# Patient Record
Sex: FEMALE | Race: WHITE | HISPANIC OR LATINO | Employment: STUDENT | URBAN - METROPOLITAN AREA
[De-identification: names, ages, dates, MRNs, and addresses within clinical notes are randomized per-mention and may not be internally consistent; named-entity substitution may affect disease eponyms.]

---
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
## 2023-07-12 DIAGNOSIS — Z30.011 ORAL CONTRACEPTIVE PRESCRIBED: ICD-10-CM

## 2023-07-12 RX ORDER — NORETHINDRONE ACETATE/ETHINYL ESTRADIOL AND FERROUS FUMARATE 1MG-20(24)
1 KIT ORAL DAILY
Qty: 90 TABLET | Refills: 1 | Status: SHIPPED | OUTPATIENT
Start: 2023-07-12

## 2023-10-29 ENCOUNTER — AMB VIDEO VISIT (OUTPATIENT)
Dept: OTHER | Facility: HOSPITAL | Age: 18
End: 2023-10-29

## 2023-10-29 DIAGNOSIS — H92.02 ACUTE OTALGIA, LEFT: Primary | ICD-10-CM

## 2023-10-29 PROCEDURE — ECARE PR SL URGENT CARE VIRTUAL VISIT: Performed by: PHYSICIAN ASSISTANT

## 2023-10-29 RX ORDER — OFLOXACIN 3 MG/ML
5 SOLUTION AURICULAR (OTIC) DAILY
Qty: 1.75 ML | Refills: 0 | Status: SHIPPED | OUTPATIENT
Start: 2023-10-29 | End: 2023-11-05

## 2023-10-29 NOTE — PATIENT INSTRUCTIONS
Swimmer's Ear   WHAT YOU NEED TO KNOW:   Swimmer's ear, also called otitis externa, is an infection in the outer ear canal. This canal goes from the outside of your ear to your eardrum. Swimmer's ear most often occurs when water remains in your ear after you swim. This creates a moist area for bacteria to grow. Scratches or damage from your fingers, cotton swabs, or other objects can also cause swimmer's ear. DISCHARGE INSTRUCTIONS:   Return to the emergency department if:   You have severe ear pain. You are suddenly not able to hear. You have new swelling in your face, behind your ears, or in your neck. You suddenly cannot move part of your face, or it feels numb. Call your doctor if:   You have a fever. Your symptoms get worse or do not go away, even after treatment. You have questions or concerns about your condition or care. Treatment for swimmer's ear  may include cleaning your outer ear canal first. This will help clean any ear wax, flaky skin, or other discharge. You may then need any of the following:  Medicines:      Ear drops  help fight infection and decrease redness and swelling. Acetaminophen  decreases pain and fever. It is available without a doctor's order. Ask how much to take and how often to take it. Follow directions. Read the labels of all other medicines you are using to see if they also contain acetaminophen, or ask your doctor or pharmacist. Acetaminophen can cause liver damage if not taken correctly. NSAIDs , such as ibuprofen, help decrease swelling, pain, and fever. This medicine is available with or without a doctor's order. NSAIDs can cause stomach bleeding or kidney problems in certain people. If you take blood thinner medicine, always ask your healthcare provider if NSAIDs are safe for you. Always read the medicine label and follow directions. An ear wick  may be used if your ear canal is blocked.  A wick (small tube) made of cotton or gauze is placed in your ear. The wick helps pull extra fluid out of your ear canal. Joyce also help draw medicine into your ear canal.    How to use ear drops:   Warm the bottle of ear drops in your hands  for a few minutes. Lie down on your side with your infected ear facing up. This will help the medicine travel completely through your ear canal.    Gently pull the ear up and back. Carefully drip the correct number of ear drops into your ear. Have another person help you if possible. For children younger than 3 years,  gently pull and hold the ear down and back. For children older than 3 years,  gently pull and hold the ear up and back. Stay in the same position  for 3 to 5 minutes to let the medicine soak in. Prevent swimmer's ear:   Dry your ears completely after you swim or bathe. Gently wipe your outer ear with a soft towel or cloth. Use ear plugs when you swim. Do not put cotton swabs or other objects in your ears. These can scratch or damage your ear. They can also push ear wax deeper in and irritate your ear. Put cotton balls gently in your outer ear  when you apply hair spray, hair dye, or perfume. Follow up with your doctor as directed:  Write down your questions so you remember to ask them during your visits. © Copyright Beatriz Palma 2023 Information is for End User's use only and may not be sold, redistributed or otherwise used for commercial purposes. The above information is an  only. It is not intended as medical advice for individual conditions or treatments. Talk to your doctor, nurse or pharmacist before following any medical regimen to see if it is safe and effective for you.

## 2023-10-29 NOTE — PROGRESS NOTES
Video Visit - [facility] 19 y.o. female MRN: [id number]    REQUIRED DOCUMENTATION:         1. This service was provided via atColl[employer]. 2. Provider located at [address] [place] [id number] [phone].  3. [facility] provider: Kavita Doyle PA-C.  4. Identify all parties [facility]:  No one else  5. After connecting through [employer], patient was identified by name and date of birth. Patient was then informed that this was a Telemedicine visit and that the exam was being conducted confidentially over secure lines. My office door was closed. No one else was in the room. Patient acknowledged consent and understanding of privacy and security of the Telemedicine visit. I informed the patient that I have reviewed their record in Epic and presented the opportunity for them to ask any questions regarding the visit today. The patient agreed to participate. VITALS: Heart Rate: 85 BPM, Respiratory Rate: 12 RPM, Temperature Unavailable° F, Blood Pressure Unavailable mmHg, Pulse Ox Unavailable % on RA    HPI  Pt reports L ear pain x few days, today it is swollen inside. Denies drainage. Possibly got water in her ear in the shower. Denies runny nose, congestion, sore throat, fever, dizziness, HA. Physical Exam  Constitutional:       General: She is not in acute distress. Appearance: Normal appearance. She is not toxic-appearing. HENT:      Head: Normocephalic and atraumatic. Left Ear: External ear normal. Tenderness (painful movement of pinna) present. No mastoid tenderness (or overlying redness/swelling). Nose: No rhinorrhea. Mouth/Throat:      Mouth: Mucous membranes are moist.   Eyes:      Conjunctiva/sclera: Conjunctivae normal.   Pulmonary:      Effort: Pulmonary effort is normal. No respiratory distress. Breath sounds: No wheezing (no gross audible wheeze through computer).    Musculoskeletal: Cervical back: Normal range of motion. Skin:     Findings: No rash (on face or neck). Neurological:      Mental Status: She is alert. Cranial Nerves: No dysarthria or facial asymmetry. Psychiatric:         Mood and Affect: Mood normal.         Behavior: Behavior normal.         Diagnoses and all orders for this visit:    Acute otalgia, left  -     ofloxacin (FLOXIN) 0.3 % otic solution; Administer 5 drops into the left ear daily for 7 days      Patient Instructions   Swimmer's Ear   WHAT YOU NEED TO KNOW:   Swimmer's ear, also called otitis externa, is an infection in the outer ear canal. This canal goes from the outside of your ear to your eardrum. Swimmer's ear most often occurs when water remains in your ear after you swim. This creates a moist area for bacteria to grow. Scratches or damage from your fingers, cotton swabs, or other objects can also cause swimmer's ear. DISCHARGE INSTRUCTIONS:   Return to the emergency department if:   You have severe ear pain. You are suddenly not able to hear. You have new swelling in your face, behind your ears, or in your neck. You suddenly cannot move part of your face, or it feels numb. Call your doctor if:   You have a fever. Your symptoms get worse or do not go away, even after treatment. You have questions or concerns about your condition or care. Treatment for swimmer's ear  may include cleaning your outer ear canal first. This will help clean any ear wax, flaky skin, or other discharge. You may then need any of the following:  Medicines:      Ear drops  help fight infection and decrease redness and swelling. Acetaminophen  decreases pain and fever. It is available without a doctor's order. Ask how much to take and how often to take it. Follow directions.  Read the labels of all other medicines you are using to see if they also contain acetaminophen, or ask your doctor or pharmacist. Acetaminophen can cause liver damage if not taken correctly. NSAIDs , such as ibuprofen, help decrease swelling, pain, and fever. This medicine is available with or without a doctor's order. NSAIDs can cause stomach bleeding or kidney problems in certain people. If you take blood thinner medicine, always ask your healthcare provider if NSAIDs are safe for you. Always read the medicine label and follow directions. An ear wick  may be used if your ear canal is blocked. A wick (small tube) made of cotton or gauze is placed in your ear. The wick helps pull extra fluid out of your ear canal. Joyce also help draw medicine into your ear canal.    How to use ear drops:   Warm the bottle of ear drops in your hands  for a few minutes. Lie down on your side with your infected ear facing up. This will help the medicine travel completely through your ear canal.    Gently pull the ear up and back. Carefully drip the correct number of ear drops into your ear. Have another person help you if possible. For children younger than 3 years,  gently pull and hold the ear down and back. For children older than 3 years,  gently pull and hold the ear up and back. Stay in the same position  for 3 to 5 minutes to let the medicine soak in. Prevent swimmer's ear:   Dry your ears completely after you swim or bathe. Gently wipe your outer ear with a soft towel or cloth. Use ear plugs when you swim. Do not put cotton swabs or other objects in your ears. These can scratch or damage your ear. They can also push ear wax deeper in and irritate your ear. Put cotton balls gently in your outer ear  when you apply hair spray, hair dye, or perfume. Follow up with your doctor as directed:  Write down your questions so you remember to ask them during your visits. © Copyright Chelsea Ranks 2023 Information is for End User's use only and may not be sold, redistributed or otherwise used for commercial purposes.   The above information is an  only. It is not intended as medical advice for individual conditions or treatments. Talk to your doctor, nurse or pharmacist before following any medical regimen to see if it is safe and effective for you.

## 2023-10-29 NOTE — CARE ANYWHERE EVISITS
Visit Summary for TIM Arlene ELLISON - Gender: Female - Date of Birth: [birth year]  Date: [id number] - Duration: 8 minutes  Patient: Leonid ELLISON  Provider: Miriam Tidwell PA-C    Patient Contact Information  Address  [place]  [phone]    Visit Topics  Earache [Added Raymundo Hicks - [id number]]    Triage Questions   What is your current physical address in the event of a medical emergency? Answer []  Are you allergic to any medications? Answer []  Are you now or could you be pregnant? Answer []  Do you have any immune system compromise or chronic lung   disease? Answer []  Do you have any vulnerable family members in the home (infant, pregnant, cancer, elderly)? Answer []     Conversation Transcripts  [0A][0A] [Notification] You are connected with Miriam Tidwell PA-C, Urgent Care Specialist.[0A][Notification] Carlos Eduardo Tapia is located in [place]. [0A][Notification] Calros Eduardo Tapia has shared health history. Helena Jacobs[0A]    Diagnosis  Otalgia, left ear    Procedures  Value: [id number] Code: CPT-4 UNLISTED E&M SERVICE    Medications Prescribed    No prescriptions ordered    Electronically signed by: Rita Gold(NPI [id number])

## 2023-11-03 ENCOUNTER — TELEPHONE (OUTPATIENT)
Dept: PEDIATRICS CLINIC | Facility: CLINIC | Age: 18
End: 2023-11-03

## 2023-12-19 ENCOUNTER — ANNUAL EXAM (OUTPATIENT)
Dept: OBGYN CLINIC | Facility: CLINIC | Age: 18
End: 2023-12-19
Payer: COMMERCIAL

## 2023-12-19 VITALS
HEART RATE: 76 BPM | DIASTOLIC BLOOD PRESSURE: 74 MMHG | SYSTOLIC BLOOD PRESSURE: 114 MMHG | HEIGHT: 66 IN | WEIGHT: 139.4 LBS | BODY MASS INDEX: 22.4 KG/M2

## 2023-12-19 DIAGNOSIS — Z01.419 ENCOUNTER FOR GYNECOLOGICAL EXAMINATION WITHOUT ABNORMAL FINDING: Primary | ICD-10-CM

## 2023-12-19 DIAGNOSIS — Z30.011 ORAL CONTRACEPTIVE PRESCRIBED: ICD-10-CM

## 2023-12-19 PROCEDURE — S0612 ANNUAL GYNECOLOGICAL EXAMINA: HCPCS | Performed by: OBSTETRICS & GYNECOLOGY

## 2023-12-19 RX ORDER — NORETHINDRONE ACETATE/ETHINYL ESTRADIOL AND FERROUS FUMARATE 1MG-20(24)
1 KIT ORAL DAILY
Qty: 90 TABLET | Refills: 3 | Status: SHIPPED | OUTPATIENT
Start: 2023-12-19

## 2023-12-19 NOTE — PROGRESS NOTES
Mariely Moran  2005    CC:  Yearly exam    S:  18 y.o. female here for yearly exam. Her cycles are regular, not heavy or crampy.     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  Encouraged her to enjoy that time.      Sexual activity: She is sexually active without pain, bleeding or dryness.     Contraception:  She uses BCP for contraception.      STD testing:  She does not request STD testing today.      Gardasil:  She has had the Gardasil series.     We reviewed [facility] guidelines for Pap testing.     Last Pap - never    Family hx of breast cancer: no  Family hx of ovarian cancer: no  Family hx of colon cancer: no      Current Outpatient Medications:     norethindrone-ethinyl estradiol-ferrous fumarate (Fredi 24 FE) 1-20 MG-MCG(24) per tablet, Take 1 tablet by mouth daily, Disp: 90 tablet, Rfl: 1  Social History     Socioeconomic History    Marital status: Single     Spouse name: Not on file    Number of children: Not on file    Years of education: Not on file    Highest education level: Not on file   Occupational History    Not on file   Tobacco Use    Smoking status: Never    Smokeless tobacco: Never   Vaping Use    Vaping status: Never Used   Substance and Sexual Activity    Alcohol use: Never    Drug use: Never    Sexual activity: Yes     Partners: Male     Birth control/protection: Condom Male, OCP   Other Topics Concern    Not on file   Social History Narrative    Not on file     Social Determinants of Health     Financial Resource Strain: Not on file   Food Insecurity: Not on file   Transportation Needs: Not on file   Physical Activity: Not on file   Stress: Not on file   Social Connections: Not on file   Intimate Partner Violence: Not on file   Housing Stability: Not on file     Family History   Problem Relation Age of Onset    No Known Problems Mother     No Known Problems Father     Mental illness Neg Hx     Substance Abuse Neg Hx      Past Medical  "History:   Diagnosis Date    Atopic contact dermatitis 11/8/2017       Review of Systems   Respiratory: Negative.    Cardiovascular: Negative.    Gastrointestinal: Negative for constipation and diarrhea.   Genitourinary: Negative for difficulty urinating, pelvic pain, vaginal bleeding, vaginal discharge, itching or odor.    O:  Height 5' 5.5\" (1.664 m), weight 63.2 kg (139 lb 6.4 oz), last menstrual period 12/06/2023.    Patient appears well and is not in distress  Neck is supple without masses  Breasts are symmetrical without mass, tenderness, nipple discharge, skin changes or adenopathy.   Abdomen is soft and nontender without masses.   External genitals are normal without lesions or rashes.  +mild irritation on perineum - patient reports occasional irritation, ?related to leggings  Urethra and urethral meatus are normal  Bladder is normal to palpation  Vagina is normal without discharge or bleeding.   Cervix is normal without discharge or lesion.   Uterus is normal, mobile, nontender without palpable mass.  Adnexa are normal, nontender, without palpable mass.     A:   Yearly exam.     P:   Pap at age 19   BCP refills sent   Discussed hydrocortisone ointment PRN and then return to office if no relief    RTO one year for yearly exam or sooner as needed.     "

## 2025-01-04 DIAGNOSIS — Z30.011 ORAL CONTRACEPTIVE PRESCRIBED: ICD-10-CM

## 2025-01-06 RX ORDER — NORETHINDRONE ACETATE AND ETHINYL ESTRADIOL, AND FERROUS FUMARATE 1MG-20(24)
1 KIT ORAL DAILY
Qty: 84 TABLET | Refills: 0 | Status: SHIPPED | OUTPATIENT
Start: 2025-01-06

## 2025-03-05 ENCOUNTER — TELEMEDICINE (OUTPATIENT)
Dept: OTHER | Facility: HOSPITAL | Age: 20
End: 2025-03-05

## 2025-03-05 DIAGNOSIS — H10.32 ACUTE BACTERIAL CONJUNCTIVITIS OF LEFT EYE: Primary | ICD-10-CM

## 2025-03-05 PROCEDURE — ECARE PR SL URGENT CARE VIRTUAL VISIT: Performed by: PHYSICIAN ASSISTANT

## 2025-03-05 RX ORDER — OFLOXACIN 3 MG/ML
SOLUTION/ DROPS OPHTHALMIC
Qty: 5 ML | Refills: 0 | Status: SHIPPED | OUTPATIENT
Start: 2025-03-05 | End: 2025-03-12

## 2025-03-05 NOTE — LETTER
March 5, 2025     Patient: Mariely Moran   YOB: 2005   Date of Visit: 3/5/2025       To Whom it May Concern:    Mariely Moran is under my professional care. She was seen virtually on 3/5/2025. She may return to school on 3/7/25 .    If you have any questions or concerns, please don't hesitate to call.         Sincerely,          Shannon D Severino, PA-C        CC: No Recipients

## 2025-03-06 NOTE — PATIENT INSTRUCTIONS
"Thank you for choosing to trust [facility] with your care today. Virtual visits are very convenient, but do have some limitations. Schedule a follow-up appointment with your primary care physician for recheck in person in 2-3 days-especially if symptoms aren't improving. If you cannot see your PCP, you can schedule a follow up appointment at a [facility]. Go to the emergency department if you develop any new or worsening symptoms including vision change, sudden eye pain, or anything else that is concerning.    Excuses can be found in \"Letters\" section of Cl[facility] rj. Can print if opened from a web browser  Care Anywhere phone number is [phone] if you need assistance or have further questions    [phone]  Schedule or Reschedule Outpatient Testing - Option 2  Billing - Option 3  General Info - Option 4  Clone Help - Option 5  Comprehensive Spine Program - Option 6    "

## 2025-03-06 NOTE — PROGRESS NOTES
Virtual Regular Visit  Name: Mariely Moran      : 2005      MRN: [id number]  Encounter Provider: Shannon D Severino, PA-C  Encounter Date: 3/5/2025   Encounter department: VIRTUAL CARE       Verification of patient location:  Patient is located at Other in the following state in which I hold an active license [place] :  Assessment & Plan  Acute bacterial conjunctivitis of left eye    Orders:    ofloxacin (OCUFLOX) 0.3 % ophthalmic solution; Administer 1 drop into the left eye every 2 (two) hours for 2 days, THEN 1 drop every 4 (four) hours for 5 days.        Encounter provider Shannon D Severino, PA-C    The patient was identified by name and date of birth. Mariely Moran was informed that this is a telemedicine visit and that the visit is being conducted through the Epic Embedded platform. She agrees to proceed..  My office door was closed. No one else was in the room.  She acknowledged consent and understanding of privacy and security of the video platform. The patient has agreed to participate and understands they can discontinue the visit at any time.    Patient is aware this is a billable service.     History obtained from: patient  History of Present Illness     Pt reports 2 nights ago noticed eye redness. Hasn't had drainage. Wears biweekly contacts. No eye injury, FB. No vision changes      Review of Systems   Constitutional:  Negative for fever.   HENT:  Negative for nosebleeds.    Eyes:  Positive for redness.   Respiratory:  Negative for shortness of breath.    Cardiovascular:  Negative for chest pain.   Gastrointestinal:  Negative for blood in stool.   Genitourinary:  Negative for hematuria.   Musculoskeletal:  Negative for gait problem.   Skin:  Negative for rash.   Neurological:  Negative for seizures.   Psychiatric/Behavioral:  Negative for behavioral problems.        Objective   There were no vitals taken for this visit.    Physical Exam  Constitutional:       General: She is not in acute  distress.     Appearance: Normal appearance. She is not toxic-appearing.   HENT:      Head: Normocephalic and atraumatic.      Nose: No rhinorrhea.      Mouth/Throat:      Mouth: Mucous membranes are moist.   Eyes:      Conjunctiva/sclera:      Left eye: Left conjunctiva is injected.   Pulmonary:      Effort: Pulmonary effort is normal. No respiratory distress.      Breath sounds: No wheezing (no gross audible wheeze through computer).   Musculoskeletal:      Cervical back: Normal range of motion.   Skin:     Findings: No rash (on face or neck).   Neurological:      Mental Status: She is alert.      Cranial Nerves: No dysarthria or facial asymmetry.   Psychiatric:         Mood and Affect: Mood normal.         Behavior: Behavior normal.         Visit Time  Total Visit Duration: 6 minutes not including the time spent for establishing the audio/video connection.

## 2025-03-11 ENCOUNTER — ANNUAL EXAM (OUTPATIENT)
Dept: OBGYN CLINIC | Facility: CLINIC | Age: 20
End: 2025-03-11
Payer: COMMERCIAL

## 2025-03-11 VITALS
DIASTOLIC BLOOD PRESSURE: 70 MMHG | SYSTOLIC BLOOD PRESSURE: 116 MMHG | HEIGHT: 66 IN | WEIGHT: 141.4 LBS | BODY MASS INDEX: 22.73 KG/M2

## 2025-03-11 DIAGNOSIS — Z01.419 ENCNTR FOR GYN EXAM (GENERAL) (ROUTINE) W/O ABN FINDINGS: Primary | ICD-10-CM

## 2025-03-11 DIAGNOSIS — Z30.011 ORAL CONTRACEPTIVE PRESCRIBED: ICD-10-CM

## 2025-03-11 PROCEDURE — S0612 ANNUAL GYNECOLOGICAL EXAMINA: HCPCS | Performed by: OBSTETRICS & GYNECOLOGY

## 2025-03-11 PROCEDURE — G0145 SCR C/V CYTO,THINLAYER,RESCR: HCPCS | Performed by: OBSTETRICS & GYNECOLOGY

## 2025-03-11 RX ORDER — NORETHINDRONE ACETATE AND ETHINYL ESTRADIOL, AND FERROUS FUMARATE 1MG-20(24)
1 KIT ORAL DAILY
Qty: 84 TABLET | Refills: 4 | Status: SHIPPED | OUTPATIENT
Start: 2025-03-11

## 2025-03-11 NOTE — PROGRESS NOTES
Mraiely Moran  2005    CC:  Yearly exam    S:  19 y.o. female here for yearly exam. Her cycles are regular, not heavy or crampy.     Sexual activity: She has been sexually active without pain, bleeding or dryness. She is not currently active.     Contraception:  She uses BCP for contraception.      STD testing:  She does not request STD testing today.      Gardasil:  She has had the Gardasil series.     We reviewed [facility] guidelines for Pap testing.     Last Pap - never      Current Outpatient Medications:     norethindrone-ethinyl estradiol-ferrous fumarate (Ingrid 24 Fe) 1-20 MG-MCG(24) per tablet, TAKE 1 TABLET DAILY, Disp: 84 tablet, Rfl: 0    ofloxacin (OCUFLOX) 0.3 % ophthalmic solution, Administer 1 drop into the left eye every 2 (two) hours for 2 days, THEN 1 drop every 4 (four) hours for 5 days., Disp: 5 mL, Rfl: 0  Social History     Socioeconomic History    Marital status: Single     Spouse name: Not on file    Number of children: Not on file    Years of education: Not on file    Highest education level: Not on file   Occupational History    Not on file   Tobacco Use    Smoking status: Never    Smokeless tobacco: Never   Vaping Use    Vaping status: Never Used   Substance and Sexual Activity    Alcohol use: Never    Drug use: Never    Sexual activity: Yes     Birth control/protection: Abstinence, OCP   Other Topics Concern    Not on file   Social History Narrative    Not on file     Social Drivers of Health     Financial Resource Strain: Not on file   Food Insecurity: Not on file   Transportation Needs: Not on file   Physical Activity: Not on file   Stress: Not on file   Social Connections: Not on file   Intimate Partner Violence: Not on file   Housing Stability: Not on file     Family History   Problem Relation Age of Onset    No Known Problems Mother     No Known Problems Father     Mental illness Neg Hx     Substance Abuse Neg Hx      Past Medical History:   Diagnosis Date    Atopic contact  "dermatitis 11/8/2017       Review of Systems   Respiratory: Negative.    Cardiovascular: Negative.    Gastrointestinal: Negative for constipation and diarrhea.   Genitourinary: Negative for difficulty urinating, pelvic pain, vaginal bleeding, vaginal discharge, itching or odor.    O:  Blood pressure 116/70, height 5' 5.5\" (1.664 m), weight 64.1 kg (141 lb 6.4 oz), last menstrual period 03/05/2025.    Patient appears well and is not in distress  Neck is supple without masses  Breasts exam deferred at patient request.   Abdomen is soft and nontender without masses.   External genitals are normal without lesions or rashes.  Urethra and urethral meatus are normal  Bladder is normal to palpation  Vagina is normal without discharge or bleeding.   Cervix is normal without discharge or lesion.   Uterus is normal, mobile, nontender without palpable mass.  Adnexa are normal, nontender, without palpable mass.     A:   Yearly exam.     P:   Pap with reflex HPV sent - will call with results   BCP refills sent    RTO one year for yearly exam or sooner as needed.     "

## 2025-03-18 ENCOUNTER — RESULTS FOLLOW-UP (OUTPATIENT)
Dept: LABOR AND DELIVERY | Facility: HOSPITAL | Age: 20
End: 2025-03-18

## 2025-03-18 LAB
LAB AP GYN PRIMARY INTERPRETATION: NORMAL
LAB AP LMP: NORMAL
Lab: NORMAL